# Patient Record
Sex: MALE | Race: WHITE | Employment: FULL TIME | URBAN - METROPOLITAN AREA
[De-identification: names, ages, dates, MRNs, and addresses within clinical notes are randomized per-mention and may not be internally consistent; named-entity substitution may affect disease eponyms.]

---

## 2021-10-05 ENCOUNTER — OFFICE VISIT (OUTPATIENT)
Dept: NEUROLOGY | Age: 71
End: 2021-10-05
Payer: MEDICARE

## 2021-10-05 VITALS
RESPIRATION RATE: 18 BRPM | WEIGHT: 240 LBS | OXYGEN SATURATION: 96 % | HEART RATE: 74 BPM | SYSTOLIC BLOOD PRESSURE: 124 MMHG | DIASTOLIC BLOOD PRESSURE: 78 MMHG | HEIGHT: 77 IN | BODY MASS INDEX: 28.34 KG/M2

## 2021-10-05 DIAGNOSIS — R42 DIZZINESS: Primary | ICD-10-CM

## 2021-10-05 DIAGNOSIS — I49.9 IRREGULAR HEART RHYTHM: ICD-10-CM

## 2021-10-05 DIAGNOSIS — R26.89 IMBALANCE: ICD-10-CM

## 2021-10-05 DIAGNOSIS — R20.9 SKIN SENSATION DISTURBANCE: ICD-10-CM

## 2021-10-05 PROCEDURE — 99244 OFF/OP CNSLTJ NEW/EST MOD 40: CPT | Performed by: PSYCHIATRY & NEUROLOGY

## 2021-10-05 RX ORDER — MECLIZINE HYDROCHLORIDE 25 MG/1
TABLET ORAL 2 TIMES DAILY
COMMUNITY
End: 2022-03-10

## 2021-10-05 NOTE — LETTER
10/5/2021    Patient: Kimberley Doherty   YOB: 1950   Date of Visit: 10/5/2021     Hemanth Crow, [address] [facility] [address]  Via Fax: [phone]    Dear Hemanth Crow MD,      Thank you for referring Mr. Kimberley Doherty to [address] [place] [address] for evaluation. My notes for this consultation are attached. If you have questions, please do not hesitate to call me. I look forward to following your patient along with you.       Sincerely,    Nik Jiménez, DO

## 2021-10-05 NOTE — PATIENT INSTRUCTIONS
[facility]   Statement to Patients  April 1, 2014      In an effort to ensure the large volume of patient prescription refills is processed in the most efficient and expeditious manner, we are asking our patients to assist us by calling your Pharmacy for all prescription refills, this will include also your  Mail Order Pharmacy. The pharmacy will contact our office electronically to continue the refill process. Please do not wait until the last minute to call your pharmacy. We need at least 48 hours (2days) to fill prescriptions. We also encourage you to call your pharmacy before going to  your prescription to make sure it is ready. With regard to controlled substance prescription refill requests (narcotic refills) that need to be picked up at our office, we ask your cooperation by providing us with at least 72 hours (3days) notice that you will need a refill. We will not refill narcotic prescription refill requests after 4:00pm on any weekday, Monday through Thursday, or after 2:00pm on Fridays, or on the weekends. We encourage everyone to explore another way of getting your prescription refill request processed using [employer], our patient web portal through our electronic medical record system. [employer] is an efficient and effective way to communicate your medication request directly to the office and  downloadable as an bhavin on your smart phone . [employer] also features a review functionality that allows you to view your medication list as well as leave messages for your physician. Are you ready to get connected? If so please review the attatched instructions or speak to any of our staff to get you set up right away! Thank you so much for your cooperation. Should you have any questions please contact our Practice Administrator.     The Physicians and Staff,  [facility]

## 2021-10-05 NOTE — PROGRESS NOTES
Mr. Irma Herrera presents as a new patient for evaluation of \"light headedness\" for the past few months. He was referred by PCP. Depression screening done on this patient.

## 2021-10-05 NOTE — PROGRESS NOTES
NEUROLOGY  NEW PATIENT EVALUATION/CONSULTATION       PATIENT NAME: Breana Levy    MRN: [id number]    REASON FOR CONSULTATION: Dizziness    10/05/21      Previous records (physician notes, laboratory reports, and radiology reports) and imaging studies were reviewed and summarized. My recommendations will be communicated back to the patient's physician(s) via electronic medical record and/or by [facility] [facility] mail. HISTORY OF PRESENT ILLNESS:  Breana Levy is a 66 y.o. right handed male presenting for evaluation of dizziness, constant since 2012 with worsening since July 2021. Episodes are described as lightheadedness with a sensation of internal rotation at times, worse with movement/activity. Symptoms cause slight imbalance at times with out falls. Symptoms are daily occurring several times daily, especially when standing or rotating his head to the left side. Denies nausea, vomiting, palpitations, tunneling of vision. There is no loss of consciousness associated with episodes of dizziness. Prior cardiac evaluations have included: None  Prior ENT evaluations: Several years ago, completed PT with some relief  Imaging complete: MRI Brain IAC without acute abnormalities  Medications for current sx: meclizine, no significant relief      PAST MEDICAL HISTORY:  PAD  DM  HPL  CHA  B/L retina detachment  LLE DVT    PAST SURGICAL HISTORY:  Past Surgical History:   Procedure Laterality Date    HX KNEE REPLACEMENT      Left       FAMILY HISTORY:   History reviewed. No pertinent family history.       SOCIAL HISTORY:  Social History     Socioeconomic History    Marital status:      Spouse name: Not on file    Number of children: Not on file    Years of education: Not on file    Highest education level: Not on file   Tobacco Use    Smoking status: Never Smoker    Smokeless tobacco: Never Used   Substance and Sexual Activity    Alcohol use: Never     Social Determinants of Health Financial Resource Strain:     Difficulty of Paying Living Expenses:    Food Insecurity:     Worried About Running Out of Food in the Last Year:     [address] in the Last Year:    Transportation Needs:     Lack of Transportation (Medical):  Lack of Transportation (Non-Medical):    Physical Activity:     Days of Exercise per Week:     Minutes of Exercise per Session:    Stress:     Feeling of Stress :    Social Connections:     Frequency of Communication with Friends and Family:     Frequency of Social Gatherings with Friends and Family:     Attends Baptism Services:     Active Member of Clubs or Organizations:     Attends Club or Organization Meetings:     Marital Status:          MEDICATIONS:   Current Outpatient Medications   Medication Sig Dispense Refill    meclizine (ANTIVERT) 25 mg tablet Take  by mouth two (2) times a day. ALLERGIES:  None    REVIEW OF SYSTEMS:  10 point ROS reviewed with patient. Please see scanned document under media. PHYSICAL EXAM:  Vital Signs:   Visit Vitals  /78 Comment: Lying 124/78 74, Sitting 130/78 88 and Standing 138/80 90   Pulse 74   Resp 18   Ht 6' 5\" (1.956 m)   Wt 240 lb (108.9 kg)   SpO2 96%   BMI 28.46 kg/m²        General Medical Exam:  General:  Well appearing, comfortable, in no apparent distress. Eyes/ENT: see cranial nerve examination. Neck: No masses appreciated. Full range of motion without tenderness. Respiratory:  Clear to auscultation, good air entry bilaterally. Cardiac:  Irregular rate and rhythm, no murmur. GI:  Soft, non-tender, non-distended abdomen. Bowel sounds normal. No masses, organomegaly. Extremities:  No deformities, edema, or skin discoloration. Skin:  No rashes or lesions. Neurological:  · Mental Status:  Alert and oriented to person, place, and time with fluent speech. · Cranial Nerves:   CNII/III/IV/VI: visual fields full to confrontation, EOMI, PERRL, no ptosis or nystagmus.    CN V: Facial sensation intact bilaterally, masseter 5/5   CN VII: Facial muscles symmetric and strong   CN VIII: Hears finger rub well bilaterally, intact vestibular function   CN IX/X: Normal palatal movement   CN XI: Full strength shoulder shrug bilaterally   CN XII: Tongue protrusion full and midline without fasciculation or atrophy  · Motor: Normal tone and muscle bulk with no pronator drift. Individual muscle group testing:  Shoulder abduction:   Left:5/5   Right : 5/5    Shoulder adduction:   Left:5/5   Right : 5/5    Elbow flexion:      Left:5/5   Right : 5/5  Elbow extension:    Left:5/5   Right : 5/5   Wrist flexion:    Left:5/5   Right : 5/5  Wrist extension:    Left:5/5   Right : 5/5  Arm pronation:   Left:5/5   Right : 5/5  Arm supination:   Left:5/5   Right : 5/5    Finger flexion:    Left:5/5   Right : 5/5    Finger extension:   Left:5/5   Right : 5/5   Finger abduction:  Left:5/5   Right : 5/5   Finger adduction:   Left:5/5   Right : 5/5  Hip flexion:     Left:5/5   Right : 5/5         Hip extension:   Left:5/5   Right : 5/5    Knee flexion:    Left:5/5   Right : 5/5    Knee extension:   Left:5/5   Right : 5/5    Dorsiflexion:     Left:5/5   Right : 5/5  Plantar flexion:    Left:5/5   Right : 5/5      · MSRs: No crossed adductors or clonus. RIGHT  LEFT   Brachioradialis 1+ 1+   Biceps 1+ 1+   Triceps 1+ 1+   Knee 0 0   Achilles 0 0        Plantar response Downward Downward          · Sensation: Decreased vibration to the R knee and L ankle, decreased temperature sensation distal LE b/l, otherwise normal and symmetric perception of pinprick, light touch, proprioception; (-) Romberg. · Coordination: No dysmetria. Normal rapid alternating movements; finger-to-nose and heel-to- shin testing are within normal limits. · Gait: Normal native gait, slight difficulty with tandem.      PERTINENT DATA:  OUTSIDE RECORDS:  The patient provided outside medical records which were reviewed during the course of the visit. The relevant detail are summarized above. INTERNAL RECORDS:  The patient's electronic medical record was reviewed. The relevant details include:      MRI Results (maximum last 3): Results from [place] encounter on 12/04/12    MRI BRAIN WO CONT    Narrative  **Final Report**      ICD Codes / Adm. Diagnosis: 780.4   / Dizziness and giddiness  Examination:  MR BRAIN WO CON  - [id number] - Dec  4 2012  3:59PM  Accession No:  [id number]  Reason:  DIZZINESS      REPORT:  INDICATION:   DIZZINESS    EXAMINATION:  MR BRAIN WITHOUT CONTRAST, IAC PROTOCOL    COMPARISON:  None    TECHNIQUE:  MR imaging of the brain was performed with sagittal T1, axial  T1, T2, FLAIR, DWI/ADC, axial FIESTA, thin slice noncontrast T1 through the  internal auditory canals. FINDINGS:    The ventricles are midline without hydrocephalus. There is no acute intra  or extra-axial fluid collection. 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 There is mild chronic microvascular  ischemic disease in periventricular regions of the cerebral hemispheres. There is no acute infarction. The major intracranial vascular flow-voids are  patent. Evaluation of the cranial nerves is unremarkable. There is normal  T2 hyperintensity in the inner ear structures bilaterally. .      IMPRESSION:  Chronic ischemic changes. Normal unenhanced evaluation of the internal  auditory canals. Signing/Reading Doctor: Lydia Epps ([id number])  Approved: Lydia Epps ([id number])  Dec  4 2012  4:32PM      ASSESSMENT:      ICD-10-CM ICD-9-CM    1. Dizziness  R42 780.4 DUPLEX CAROTID BILATERAL   2. Imbalance  R26.89 781.2    3. Skin sensation disturbance  R20.9 782.0 VITAMIN B12      TSH 3RD GENERATION      GAMMOPATHY EVAL, SPEP/CLAUDIO, IG QT/FLC      EMG LIMITED   4.  Irregular heart rhythm  I49.9 427.9 EKG, 12 LEAD, INITIAL   66year old male referred for evaluation of chronic dizziness since at least 2012 with worsening over the past 4 months described as lightheadedness, internal rotation and imbalance. Symptoms are daily and worse with ambulation/movement or head turning particularly to the left. Neurological examination is notable for absent lower extremity DTRs and severely reduced large/small fiber sensation in the distal lower extremities b/l suggestive of a length-dependent polyneuropathy. Risk factors include h/o DM. I suspect this is contributing to his imbalance but would not explain dizziness exacerbated with head turning. Orthostatic vitals are negative. MRI Brain was previously performed at onset of symptoms in 2012 and without structural pathology or posterior circulation ischemia. Defer repeat intracranial imaging at this time as examination does not support a central cause for his presentation. We will obtain carotid ultrasound to exclude any significant vascular stenosis. If this is unrevealing, I would recommend ENT evaluation to explore potential peripheral etiology for his dizziness/vertigo. PLAN:  · CUS  · EKG  · B12, TSH, CLAUDIO  · EMG PN protocol       I have discussed the diagnosis with the patient today and the intended plan as seen in the above orders with both the patient as well as referring provider and/or PCP via electronic correspondence. The patient has received an after-visit summary and questions were answered concerning future plans. Adriana Mike DO  Staff Neurologist  Diplomate, [address] [place] [facility] [employer] [place] [employer] & [employer]rology       CC Referring provider:    Devendra Dey MD

## 2021-10-07 ENCOUNTER — TELEPHONE (OUTPATIENT)
Dept: NEUROLOGY | Age: 71
End: 2021-10-07

## 2021-10-07 NOTE — TELEPHONE ENCOUNTER
Patient's wife is wondering if you had gotten the labs back. She stated she thought the nurse was going to call yesterday.

## 2021-10-08 LAB
ALBUMIN SERPL ELPH-MCNC: 3.9 G/DL (ref 2.9–4.4)
ALBUMIN/GLOB SERPL: 1.7 {RATIO} (ref 0.7–1.7)
ALPHA1 GLOB SERPL ELPH-MCNC: 0.2 G/DL (ref 0–0.4)
ALPHA2 GLOB SERPL ELPH-MCNC: 0.4 G/DL (ref 0.4–1)
B-GLOBULIN SERPL ELPH-MCNC: 0.8 G/DL (ref 0.7–1.3)
GAMMA GLOB SERPL ELPH-MCNC: 1 G/DL (ref 0.4–1.8)
GLOBULIN SER-MCNC: 2.4 G/DL (ref 2.2–3.9)
IGA SERPL-MCNC: 147 MG/DL (ref 61–437)
IGG SERPL-MCNC: 995 MG/DL (ref 603–1613)
IGM SERPL-MCNC: 131 MG/DL (ref 15–143)
INTERPRETATION SERPL IEP-IMP: ABNORMAL
KAPPA LC FREE SER-MCNC: 20.2 MG/L (ref 3.3–19.4)
KAPPA LC FREE/LAMBDA FREE SER: 1.3 {RATIO} (ref 0.26–1.65)
LAMBDA LC FREE SERPL-MCNC: 15.5 MG/L (ref 5.7–26.3)
M PROTEIN SERPL ELPH-MCNC: ABNORMAL G/DL
PLEASE NOTE:, 149534: ABNORMAL
PROT SERPL-MCNC: 6.3 G/DL (ref 6–8.5)
TSH SERPL DL<=0.005 MIU/L-ACNC: 1.48 UIU/ML (ref 0.45–4.5)
VIT B12 SERPL-MCNC: 463 PG/ML (ref 232–1245)

## 2021-10-08 NOTE — TELEPHONE ENCOUNTER
Spoke with Connie, informed her results were not back yet. I would call when they were available after  reviews them.

## 2021-10-29 ENCOUNTER — TELEPHONE (OUTPATIENT)
Dept: NEUROLOGY | Age: 71
End: 2021-10-29

## 2021-11-10 ENCOUNTER — OFFICE VISIT (OUTPATIENT)
Dept: NEUROLOGY | Age: 71
End: 2021-11-10
Payer: MEDICARE

## 2021-11-10 DIAGNOSIS — G62.9 POLYNEUROPATHY: Primary | ICD-10-CM

## 2021-11-10 PROCEDURE — 95910 NRV CNDJ TEST 7-8 STUDIES: CPT | Performed by: PSYCHIATRY & NEUROLOGY

## 2021-11-10 PROCEDURE — 95886 MUSC TEST DONE W/N TEST COMP: CPT | Performed by: PSYCHIATRY & NEUROLOGY

## 2022-03-10 ENCOUNTER — HOSPITAL ENCOUNTER (OUTPATIENT)
Dept: PREADMISSION TESTING | Age: 72
Discharge: HOME | End: 2022-03-10
Payer: MEDICARE

## 2022-03-10 VITALS
HEIGHT: 75 IN | DIASTOLIC BLOOD PRESSURE: 84 MMHG | SYSTOLIC BLOOD PRESSURE: 143 MMHG | RESPIRATION RATE: 18 BRPM | HEART RATE: 56 BPM | OXYGEN SATURATION: 97 % | BODY MASS INDEX: 30.74 KG/M2 | WEIGHT: 247.2 LBS | TEMPERATURE: 97.9 F

## 2022-03-10 LAB
ALBUMIN SERPL-MCNC: 3.8 G/DL (ref 3.5–5)
ALBUMIN/GLOB SERPL: 1.3 {RATIO} (ref 1.1–2.2)
ALP SERPL-CCNC: 68 U/L (ref 45–117)
ALT SERPL-CCNC: 32 U/L (ref 12–78)
ANION GAP SERPL CALC-SCNC: 6 MMOL/L (ref 5–15)
APTT PPP: 29.8 SEC (ref 21.2–34.1)
AST SERPL W P-5'-P-CCNC: 19 U/L (ref 15–37)
BILIRUB SERPL-MCNC: 0.7 MG/DL (ref 0.2–1)
BUN SERPL-MCNC: 17 MG/DL (ref 6–20)
BUN/CREAT SERPL: 17 (ref 12–20)
CA-I BLD-MCNC: 9 MG/DL (ref 8.5–10.1)
CHLORIDE SERPL-SCNC: 107 MMOL/L (ref 97–108)
CO2 SERPL-SCNC: 26 MMOL/L (ref 21–32)
CREAT SERPL-MCNC: 1.03 MG/DL (ref 0.7–1.3)
ERYTHROCYTE [DISTWIDTH] IN BLOOD BY AUTOMATED COUNT: 13.8 % (ref 11.5–14.5)
GLOBULIN SER CALC-MCNC: 3 G/DL (ref 2–4)
GLUCOSE SERPL-MCNC: 83 MG/DL (ref 65–100)
HCT VFR BLD AUTO: 43.2 % (ref 36.6–50.3)
HGB BLD-MCNC: 14 G/DL (ref 12.1–17)
INR PPP: 1 (ref 0.9–1.1)
MCH RBC QN AUTO: 30.6 PG (ref 26–34)
MCHC RBC AUTO-ENTMCNC: 32.4 G/DL (ref 30–36.5)
MCV RBC AUTO: 94.5 FL (ref 80–99)
NRBC # BLD: 0 K/UL (ref 0–0.01)
NRBC BLD-RTO: 0 PER 100 WBC
PLATELET # BLD AUTO: 145 K/UL (ref 150–400)
PMV BLD AUTO: 11.7 FL (ref 8.9–12.9)
POTASSIUM SERPL-SCNC: 4.4 MMOL/L (ref 3.5–5.1)
PROT SERPL-MCNC: 6.8 G/DL (ref 6.4–8.2)
PROTHROMBIN TIME: 13.3 SEC (ref 11.9–14.6)
RBC # BLD AUTO: 4.57 M/UL (ref 4.1–5.7)
SODIUM SERPL-SCNC: 139 MMOL/L (ref 136–145)
THERAPEUTIC RANGE,PTTT: NORMAL SEC (ref 82–109)
WBC # BLD AUTO: 6.1 K/UL (ref 4.1–11.1)

## 2022-03-10 PROCEDURE — 36415 COLL VENOUS BLD VENIPUNCTURE: CPT

## 2022-03-10 PROCEDURE — 85027 COMPLETE CBC AUTOMATED: CPT

## 2022-03-10 PROCEDURE — 85730 THROMBOPLASTIN TIME PARTIAL: CPT

## 2022-03-10 PROCEDURE — 80053 COMPREHEN METABOLIC PANEL: CPT

## 2022-03-10 PROCEDURE — 85610 PROTHROMBIN TIME: CPT

## 2022-03-10 RX ORDER — ASPIRIN 81 MG/1
81 TABLET ORAL DAILY
COMMUNITY

## 2022-03-10 RX ORDER — ISOSORBIDE MONONITRATE 30 MG/1
30 TABLET, EXTENDED RELEASE ORAL DAILY
COMMUNITY

## 2022-03-10 RX ORDER — ATENOLOL 25 MG/1
25 TABLET ORAL DAILY
COMMUNITY

## 2022-03-10 RX ORDER — BISMUTH SUBSALICYLATE 262 MG
1 TABLET,CHEWABLE ORAL DAILY
COMMUNITY

## 2022-03-10 NOTE — PROGRESS NOTES
64- Dr. Celine Abel called made aware of patient's abnormal labs CBC-- Platelets 095. Also made him aware patient stated during PAT that he has a history of DVT and had a blood clot filter placed he was unsure of the name of the filter and did not have an implant card. No new orders received Dr. Celine Abel stated ok to proceed with procedure as scheduled.

## 2022-03-14 ENCOUNTER — HOSPITAL ENCOUNTER (OUTPATIENT)
Age: 72
Discharge: HOME | End: 2022-03-14
Attending: INTERNAL MEDICINE | Admitting: INTERNAL MEDICINE
Payer: MEDICARE

## 2022-03-14 VITALS
BODY MASS INDEX: 30.71 KG/M2 | WEIGHT: 247 LBS | OXYGEN SATURATION: 97 % | DIASTOLIC BLOOD PRESSURE: 89 MMHG | TEMPERATURE: 97.6 F | HEIGHT: 75 IN | SYSTOLIC BLOOD PRESSURE: 146 MMHG | HEART RATE: 52 BPM | RESPIRATION RATE: 18 BRPM

## 2022-03-14 DIAGNOSIS — R94.39 ABNORMAL STRESS TEST: ICD-10-CM

## 2022-03-14 PROBLEM — I25.10 CAD (CORONARY ARTERY DISEASE): Status: ACTIVE | Noted: 2022-03-14

## 2022-03-14 LAB
CHOLEST SERPL-MCNC: 180 MG/DL
HDLC SERPL-MCNC: 58 MG/DL
HDLC SERPL: 3.1 {RATIO} (ref 0–5)
LDLC SERPL CALC-MCNC: 94.6 MG/DL (ref 0–100)
LIPID PROFILE,FLP: NORMAL
TRIGL SERPL-MCNC: 137 MG/DL (ref ?–150)
VLDLC SERPL CALC-MCNC: 27.4 MG/DL

## 2022-03-14 PROCEDURE — 77030015766: Performed by: INTERNAL MEDICINE

## 2022-03-14 PROCEDURE — 99152 MOD SED SAME PHYS/QHP 5/>YRS: CPT | Performed by: INTERNAL MEDICINE

## 2022-03-14 PROCEDURE — 76210000022 HC REC RM PH II 1.5 TO 2 HR: Performed by: INTERNAL MEDICINE

## 2022-03-14 PROCEDURE — 74011000250 HC RX REV CODE- 250: Performed by: INTERNAL MEDICINE

## 2022-03-14 PROCEDURE — 77030042317 HC BND COMPR HEMSTAT -B: Performed by: INTERNAL MEDICINE

## 2022-03-14 PROCEDURE — 80061 LIPID PANEL: CPT

## 2022-03-14 PROCEDURE — 76210000017 HC OR PH I REC 1.5 TO 2 HR: Performed by: INTERNAL MEDICINE

## 2022-03-14 PROCEDURE — C1894 INTRO/SHEATH, NON-LASER: HCPCS | Performed by: INTERNAL MEDICINE

## 2022-03-14 PROCEDURE — 74011250636 HC RX REV CODE- 250/636: Performed by: INTERNAL MEDICINE

## 2022-03-14 PROCEDURE — 74011000636 HC RX REV CODE- 636: Performed by: INTERNAL MEDICINE

## 2022-03-14 PROCEDURE — C1769 GUIDE WIRE: HCPCS | Performed by: INTERNAL MEDICINE

## 2022-03-14 PROCEDURE — 77030029065 HC DRSG HEMO QCLOT ZMED -B: Performed by: INTERNAL MEDICINE

## 2022-03-14 PROCEDURE — 36415 COLL VENOUS BLD VENIPUNCTURE: CPT

## 2022-03-14 PROCEDURE — 77030019698 HC SYR ANGI MDLON MRTM -A: Performed by: INTERNAL MEDICINE

## 2022-03-14 PROCEDURE — 77030040934 HC CATH DIAG DXTERITY MEDT -A: Performed by: INTERNAL MEDICINE

## 2022-03-14 PROCEDURE — 93458 L HRT ARTERY/VENTRICLE ANGIO: CPT | Performed by: INTERNAL MEDICINE

## 2022-03-14 RX ORDER — ONDANSETRON 2 MG/ML
4 INJECTION INTRAMUSCULAR; INTRAVENOUS
Status: DISCONTINUED | OUTPATIENT
Start: 2022-03-14 | End: 2022-03-14 | Stop reason: HOSPADM

## 2022-03-14 RX ORDER — SODIUM CHLORIDE 0.9 % (FLUSH) 0.9 %
5-40 SYRINGE (ML) INJECTION EVERY 8 HOURS
Status: DISCONTINUED | OUTPATIENT
Start: 2022-03-14 | End: 2022-03-14 | Stop reason: HOSPADM

## 2022-03-14 RX ORDER — SODIUM CHLORIDE 9 MG/ML
50 INJECTION, SOLUTION INTRAVENOUS CONTINUOUS
Status: DISCONTINUED | OUTPATIENT
Start: 2022-03-14 | End: 2022-03-14 | Stop reason: HOSPADM

## 2022-03-14 RX ORDER — VERAPAMIL HYDROCHLORIDE 2.5 MG/ML
INJECTION, SOLUTION INTRAVENOUS AS NEEDED
Status: DISCONTINUED | OUTPATIENT
Start: 2022-03-14 | End: 2022-03-14 | Stop reason: HOSPADM

## 2022-03-14 RX ORDER — FENTANYL CITRATE 50 UG/ML
INJECTION, SOLUTION INTRAMUSCULAR; INTRAVENOUS AS NEEDED
Status: DISCONTINUED | OUTPATIENT
Start: 2022-03-14 | End: 2022-03-14 | Stop reason: HOSPADM

## 2022-03-14 RX ORDER — LIDOCAINE HYDROCHLORIDE 10 MG/ML
INJECTION INFILTRATION; PERINEURAL AS NEEDED
Status: DISCONTINUED | OUTPATIENT
Start: 2022-03-14 | End: 2022-03-14 | Stop reason: HOSPADM

## 2022-03-14 RX ORDER — MIDAZOLAM HYDROCHLORIDE 1 MG/ML
INJECTION INTRAMUSCULAR; INTRAVENOUS AS NEEDED
Status: DISCONTINUED | OUTPATIENT
Start: 2022-03-14 | End: 2022-03-14 | Stop reason: HOSPADM

## 2022-03-14 RX ORDER — HEPARIN SODIUM 1000 [USP'U]/ML
INJECTION, SOLUTION INTRAVENOUS; SUBCUTANEOUS AS NEEDED
Status: DISCONTINUED | OUTPATIENT
Start: 2022-03-14 | End: 2022-03-14 | Stop reason: HOSPADM

## 2022-03-14 RX ORDER — SODIUM CHLORIDE 0.9 % (FLUSH) 0.9 %
5-40 SYRINGE (ML) INJECTION AS NEEDED
Status: DISCONTINUED | OUTPATIENT
Start: 2022-03-14 | End: 2022-03-14 | Stop reason: HOSPADM

## 2022-03-14 RX ORDER — OXYCODONE AND ACETAMINOPHEN 5; 325 MG/1; MG/1
1 TABLET ORAL
Status: DISCONTINUED | OUTPATIENT
Start: 2022-03-14 | End: 2022-03-14 | Stop reason: HOSPADM

## 2022-03-14 RX ORDER — NITROGLYCERIN 5 MG/ML
INJECTION, SOLUTION INTRAVENOUS AS NEEDED
Status: DISCONTINUED | OUTPATIENT
Start: 2022-03-14 | End: 2022-03-14 | Stop reason: HOSPADM

## 2022-03-14 RX ORDER — NALOXONE HYDROCHLORIDE 0.4 MG/ML
0.4 INJECTION, SOLUTION INTRAMUSCULAR; INTRAVENOUS; SUBCUTANEOUS AS NEEDED
Status: DISCONTINUED | OUTPATIENT
Start: 2022-03-14 | End: 2022-03-14 | Stop reason: HOSPADM

## 2022-03-14 RX ADMIN — SODIUM CHLORIDE 50 ML/HR: 9 INJECTION, SOLUTION INTRAVENOUS at 11:40

## 2022-03-14 NOTE — Clinical Note
TRANSFER - OUT REPORT:     Verbal report given to: DOYLE Shipman, (at bedside). Report consisted of patient's Situation, Background, Assessment and   Recommendations(SBAR). Opportunity for questions and clarification was provided. Patient transported with a Registered Nurse. Patient transported to: recovery.

## 2022-03-14 NOTE — PROGRESS NOTES
Pt transported to [facility] room 24. Report given to RN assuming care, cath site inspected, c/d/i and no bleeding or hematoma.

## 2022-03-14 NOTE — PERIOP NOTES
Ambulated to bathroom with no difficulties. Discharge instructions reviewed, verbalizes understanding. Discharged via wheelchair to wife in private vehicle.

## 2022-03-14 NOTE — Clinical Note
Contrast Dose Calculator:   Patient's age: 66.   Patient's sex: Male. Patient weight (kg) = 112. Creatinine level (mg/dL) = 1.03.   Creatinine clearance (mL/min): 103. Contrast concentration (mg/mL) = 370. MACD = 300 mL. Max Contrast dose per Creatinine Cl calculator = 231.75 mL.

## 2022-03-16 ENCOUNTER — DOCUMENTATION ONLY (OUTPATIENT)
Dept: CARDIOLOGY | Age: 72
End: 2022-03-16

## 2022-03-20 PROBLEM — I25.10 CAD (CORONARY ARTERY DISEASE): Status: ACTIVE | Noted: 2022-03-14

## 2023-12-11 NOTE — PROGRESS NOTES
Patient ID: [id number]     Chief Complaint: Results (Discuss labs)    HPI:     Say Mckeon is a 55 y.o. male here today for Results (Discuss labs). History of pituitary tumor and decreased testosterone levels. History of Diabetes mellitus Type 2 that was treated until now with diet and lifestyle changes. Recent A1C of 8.5 on 11/22/23.     ----------------------------  BPH (benign prostatic hyperplasia)  BPH with obstruction/lower urinary tract symptoms  Elevated PSA  Erectile dysfunction  History of pituitary tumor  Hyperglycemia  Hypogonadism in male  Low testosterone in male  Personal history of colonic polyps      Comment:  s/p polypectomy - Dr MADDIE Mccracken  Peyronie's disease  Testicular hypofunction     Past Surgical History:   Procedure Laterality Date    COLONOSCOPY W/ BIOPSIES AND POLYPECTOMY  11/17/2021    Dr MADDIE Mccracken    UMBILICAL HERNIA REPAIR  12/13/2021    Dr Skylar Barahona       Review of patient's allergies indicates:  No Known Allergies    Outpatient Medications Marked as Taking for the 12/11/23 encounter (Office Visit) with Glenn Valencia DO   Medication Sig Dispense Refill    ciprofloxacin-dexAMETHasone 0.3-0.1% (CIPRODEX) 0.3-0.1 % DrpS Place 4 drops into both ears 2 (two) times daily.      sildenafiL (VIAGRA) 100 MG tablet Take 100 mg by mouth daily as needed for Erectile Dysfunction.      testosterone cypionate (DEPOTESTOTERONE CYPIONATE) 200 mg/mL injection INJECT 1 ML INTRAMUSCULARLY EVERY 7 DAYS. 4 mL 2       Social History     Socioeconomic History    Marital status:    Tobacco Use    Smoking status: Never    Smokeless tobacco: Never   Substance and Sexual Activity    Alcohol use: Never    Drug use: Never    Sexual activity: Yes     Partners: Female     Social Determinants of Health     Financial Resource Strain: Low Risk  (11/8/2023)    Overall Financial Resource Strain (CARDIA)     Difficulty of Paying Living Expenses: Not hard at all   Food Insecurity: No Food  [address]  [address] [facility] [address]   Phone [phone] Fax [phone]  Test Date:  11/10/2021    Patient: Sachin Banks : 1950 Physician: Ana Kinney. Hugh Brizuela DO   Sex: Male Height: 6' 5\" Ref Phys: Ana Kinney. Hugh Brizuela DO   ID#: [id number]  Weight: 240 lbs. Technician: Nallely Magana     Patient Complaints:  Imbalance; Skin sensation disturbance    NCV & EMG Findings:  Evaluation of the left peroneal motor nerve showed reduced amplitude (1.1 mV) and decreased conduction velocity (B Fib-Ankle, 37 m/s). The right peroneal motor nerve showed reduced amplitude (0.7 mV) and decreased conduction velocity (Poplt-B Fib, 16 m/s). The left tibial motor and the right tibial motor nerves showed reduced amplitude (L0.6, R0.6 mV) and decreased conduction velocity (Knee-Ankle, L24, R29 m/s). The left Sup Peroneal sensory and the right Sup Peroneal sensory nerves showed no response (14 cm). The left sural sensory and the right sural sensory nerves showed no response (Calf). Left vs. Right side comparison data for the tibial motor nerve indicates abnormal L-R latency difference (2.1 ms). F Wave studies indicate that the right tibial F wave has prolonged latency (78.63 ms). Needle evaluation of the left extensor digitorum brevis and the left abductor hallucis muscles showed increased motor unit amplitude, increased motor unit duration, rapid recruitment, and very decreased interference pattern. All remaining muscles (as indicated in the following table) showed no evidence of electrical instability. Impression:  Extensive electrodiagnostic examination of the left lower extremity and additional nerve conduction studies of the right lower extremity reveals the followin.  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, axon loss in Insecurity (11/8/2023)    Hunger Vital Sign     Worried About Running Out of Food in the Last Year: Never true     Ran Out of Food in the Last Year: Never true   Transportation Needs: No Transportation Needs (11/8/2023)    PRAPARE - Transportation     Lack of Transportation (Medical): No     Lack of Transportation (Non-Medical): No   Physical Activity: Sufficiently Active (11/8/2023)    Exercise Vital Sign     Days of Exercise per Week: 5 days     Minutes of Exercise per Session: 30 min   Stress: No Stress Concern Present (11/8/2023)    [employer] - Occupational Stress Questionnaire     Feeling of Stress : Only a little   Social Connections: Socially Integrated (11/8/2023)    Social Connection and Isolation Panel [NHANES]     Frequency of Communication with Friends and Family: More than three times a week     Frequency of Social Gatherings with Friends and Family: More than three times a week     Attends Baptism Services: More than 4 times per year     Active Member of Clubs or Organizations: No     Attends Club or Organization Meetings: More than 4 times per year     Marital Status:    Housing Stability: Low Risk  (11/8/2023)    Housing Stability Vital Sign     Unable to Pay for Housing in the Last Year: No     Number of Places Lived in the Last Year: 1     Unstable Housing in the Last Year: No        Family History   Problem Relation Age of Onset    Kidney failure Father         Patient Care Team:  Glenn Valencia DO as PCP - General (Family Medicine)  Jaziel Murillo MD as Consulting Physician (Otolaryngology)  [facility] (Gastroenterology)  Sanford Mccracken MD as Consulting Physician (Gastroenterology)     Subjective:     Review of Systems   Constitutional:  Negative for chills and fever.   Respiratory:  Negative for shortness of breath.    Cardiovascular:  Negative for chest pain.   Gastrointestinal:  Negative for  type and severe in degree electrically. 2. No evidence of a left lumbosacral motor radiculopathy. ___________________________  Sachi Moore, DO        Nerve Conduction Studies  Anti Sensory Summary Table     Stim Site NR Peak (ms) Norm Peak (ms) P-T Amp (µV) Norm P-T Amp Onset (ms) Site1 Site2 Delta-P (ms) Dist (cm) Abcilio (m/s) Norm Bacilio (m/s)   Left Sup Peroneal Anti Sensory (Ant Lat Mall)  33.8°C   14 cm NR  <4.4  >5.0  14 cm Ant Lat Mall  14.0  >32   Right Sup Peroneal Anti Sensory (Ant Lat Mall)  32.7°C   14 cm NR  <4.4  >5.0  14 cm Ant Lat Mall  14.0  >32   Left Sural Anti Sensory (Lat Mall)  33.2°C   Calf NR  <4.0  >5.0  Calf Lat Mall  14.0  >35   Right Sural Anti Sensory (Lat Mall)  32.1°C   Calf NR  <4.0  >5.0  Calf Lat Mall  14.0  >35     Motor Summary Table     Stim Site NR Onset (ms) Norm Onset (ms) O-P Amp (mV) Norm O-P Amp Site1 Site2 Delta-0 (ms) Dist (cm) Bacilio (m/s) Norm Bacilio (m/s)   Left Peroneal Motor (Ext Dig Brev)  33.4°C   Ankle    5.2 <6.1 1.1 >2.5 B Fib Ankle 11.4 42.0 37 >38   B Fib    16.6  0.6  Poplt B Fib 3.9 0.0  >40   Poplt    20.5  0.5          Right Peroneal Motor (Ext Dig Brev)  32.2°C   Ankle    5.5 <6.1 0.7 >2.5 B Fib Ankle 8.2 40.0 49 >38   B Fib    13.7  0.6  Poplt B Fib 6.3 10.0 16 >40   Poplt    20.0  0.6          Left Tibial Motor (Abd Quintero Brev)  33.6°C   Ankle    3.4 <6.1 0.6 >3.0 Knee Ankle 20.4 48.0 24 >35   Knee    23.8  0.6          Right Tibial Motor (Abd Quintero Brev)  32.5°C   Ankle    5.5 <6.1 0.6 >3.0 Knee Ankle 15.0 44.0 29 >35   Knee    20.5  0.6            F Wave Studies     NR F-Lat (ms) Lat Norm (ms) L-R F-Lat (ms) L-R Lat Norm   Right Tibial (Mrkrs) (Abd Hallucis)  32.7°C      78.63 <61  <5.7     EMG     Side Muscle Nerve Root Ins Act Fibs Psw Amp Dur Poly Recrt Int Pat Comment   Left Ext Dig Brev Dp Br Peronel L5, S1 Nml Nml Nml Incr >12ms 0 Rapid 25%    Left AbdHallucis MedPlantar S1-2 Nml Nml Nml Incr >12ms 0 Rapid 25%    Left PostTibialis Tibial "constipation and diarrhea.   Neurological:  Negative for dizziness and headaches.   Psychiatric/Behavioral:  The patient does not have insomnia.        See HPI for details  All Other ROS: Negative except as stated in HPI.       Objective:     /77   Pulse 62   Temp 97.7 °F (36.5 °C) (Temporal)   Resp 18   Ht 5' 11.65" (1.82 m)   Wt (!) 149.7 kg (330 lb)   SpO2 95%   BMI 45.19 kg/m²     Physical Exam  Vitals reviewed.   Constitutional:       General: He is not in acute distress.     Appearance: Normal appearance. He is obese. He is not ill-appearing.   Cardiovascular:      Rate and Rhythm: Normal rate and regular rhythm.      Pulses: Normal pulses.      Heart sounds: Normal heart sounds. No murmur heard.     No friction rub. No gallop.   Pulmonary:      Effort: No respiratory distress.      Breath sounds: No wheezing, rhonchi or rales.   Musculoskeletal:         General: No swelling.      Right lower leg: No edema.      Left lower leg: No edema.   Skin:     General: Skin is warm and dry.   Neurological:      General: No focal deficit present.      Mental Status: He is alert.   Psychiatric:         Mood and Affect: Mood normal.         Behavior: Behavior normal.         Assessment/Plan:     1. Type 2 diabetes mellitus with hyperglycemia, without long-term current use of insulin  -     tirzepatide (MOUNJARO) 2.5 mg/0.5 mL PnIj; Inject 2.5 mg into the skin every 7 days.  Dispense: 4 pen ; Refill: 2  -     lancets Misc; To check BG 1 times daily, to use with insurance preferred meter  Dispense: 200 each; Refill: 3  -     blood sugar diagnostic Strp; To check BG 1 times daily, to use with insurance preferred meter  Dispense: 200 each; Refill: 3  Will start on low dose mounjaro to help with glucose control as well as weight loss.   2. Decreased testosterone level  -Continue testosterone as prescribed  3. History of pituitary tumor  Continue testosterone as prescribed. Cause of decreased testosterone levels. " L5, S1 Nml Nml Nml Nml Nml 0 Nml Nml    Left Gastroc Tibial S1-2 Nml Nml Nml Nml Nml 0 Nml Nml    Left AntTibialis Dp Br Peronel L4-5 Nml Nml Nml Nml Nml 0 Nml Nml    Left RectFemoris Femoral L2-4 Nml Nml Nml Nml Nml 0 Nml Nml          Waveforms: Will continue to monitor other endocrine function such as thyroid function.         Follow up:     Follow up in about 3 months (around 3/11/2024) for Follow up. In addition to their scheduled follow up, the patient has also been instructed to follow up on as needed basis.

## (undated) DEVICE — GUIDEWIRE VASC L260CM DIA0.035IN RAD 3MM J TIP L7CM PTFE

## (undated) DEVICE — CATH 5F 100CM JR40 -- DXTERITY

## (undated) DEVICE — SURGICAL PROCEDURE TRAY CRD CATH 3 PRT

## (undated) DEVICE — 48" PROBE COVER W/GEL, ULTRASOUND, STERILE: Brand: SITE-RITE

## (undated) DEVICE — GLIDESHEATH SLENDER ACCESS KIT: Brand: GLIDESHEATH SLENDER

## (undated) DEVICE — SYRINGE MED 10ML RED POLYCARB BRL FIX M LUER CONN FLAT GRP

## (undated) DEVICE — WASTEBAG DRIP/ADAPTER: Brand: MEDLINE INDUSTRIES, INC.

## (undated) DEVICE — SYRINGE MED 10ML PUR GAM COMPATIBLE POLYCARB FIX M LUER CONN

## (undated) DEVICE — 3M™ STERI-DRAPE™ SMALL DRAPE WITH ADHESIVE APERTURE 1092 25/BX,4/CS&#X20;: Brand: STERI-DRAPE™

## (undated) DEVICE — TUBING PRESS INJ FLX SH 30IN --

## (undated) DEVICE — DEVICE COMPR REG 24 CM VASC BND

## (undated) DEVICE — RADIFOCUS OPTITORQUE ANGIOGRAPHIC CATHETER: Brand: OPTITORQUE

## (undated) DEVICE — 3M™ TEGADERM™ TRANSPARENT FILM DRESSING FRAME STYLE, 1626W, 4 IN X 4-3/4 IN (10 CM X 12 CM), 50/CT 4CT/CASE: Brand: 3M™ TEGADERM™